# Patient Record
Sex: FEMALE | Race: WHITE | URBAN - METROPOLITAN AREA
[De-identification: names, ages, dates, MRNs, and addresses within clinical notes are randomized per-mention and may not be internally consistent; named-entity substitution may affect disease eponyms.]

---

## 2021-09-22 ENCOUNTER — OFFICE VISIT (OUTPATIENT)
Dept: URBAN - METROPOLITAN AREA CLINIC 58 | Facility: CLINIC | Age: 46
End: 2021-09-22
Payer: COMMERCIAL

## 2021-09-22 DIAGNOSIS — E10.3553 TYPE 1 DIABETES MELLITUS WITH STABLE PROLIFERATIVE DIABETIC RETINOPATHY, BILATERAL: ICD-10-CM

## 2021-09-22 DIAGNOSIS — H17.12 CENTRAL CORNEAL OPACITY OF LEFT EYE: ICD-10-CM

## 2021-09-22 DIAGNOSIS — H52.13 MYOPIA, BILATERAL: Primary | ICD-10-CM

## 2021-09-22 PROCEDURE — 92004 COMPRE OPH EXAM NEW PT 1/>: CPT | Performed by: OPTOMETRIST

## 2021-09-22 RX ORDER — PREDNISOLONE ACETATE 10 MG/ML
1 % SUSPENSION/ DROPS OPHTHALMIC
Qty: 10 | Refills: 2 | Status: ACTIVE
Start: 2021-09-22

## 2021-09-22 ASSESSMENT — INTRAOCULAR PRESSURE
OS: 9
OD: 19

## 2021-09-22 ASSESSMENT — KERATOMETRY: OD: 45.38

## 2021-09-22 ASSESSMENT — VISUAL ACUITY: OD: 20/20

## 2021-09-22 NOTE — IMPRESSION/PLAN
Impression: Central corneal opacity of left eye: H17.12. Plan: Diagnosis explained in detail, condition seems severe. Prednisolone Ace. 1gtt OU bid x 1 wk then qd x 1 wk indefinitely . Pt to call if condition worsens.

## 2021-09-22 NOTE — IMPRESSION/PLAN
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 Plan: Diabetes type I: moderated background diabetic retinopathy, no signs of neovascularization noted. Patient referred to retinal specialist to determine if treatment is necessary. Discussed ocular and systemic benefits of maintaining blood sugar control.